# Patient Record
Sex: FEMALE | Race: WHITE
[De-identification: names, ages, dates, MRNs, and addresses within clinical notes are randomized per-mention and may not be internally consistent; named-entity substitution may affect disease eponyms.]

---

## 2017-02-11 NOTE — UC
Abdominal Pain Female HPI





- HPI Summary


HPI Summary: 


7 days of flu like sx--n/v/d for 3 -4 days decrease urination----family with 

similr symptoms that have resolved in less time








- History of Current Complaint


Chief Complaint: UCGeneralIllness


Stated Complaint: FLU SYMPTOMS


Time Seen by Provider: 02/11/17 14:49


Hx Obtained From: Patient


Hx Last Menstrual Period: 1/24/17


Pregnant?: No


Onset/Duration: Sudden Onset, Lasting Days - 7, Still Present


Timing: Constant


Severity Initially: Mild


Severity Currently: Moderate


Pain Intensity: 7


Pain Scale Used: 0-10 Numeric


Location: Diffuse


Radiates: No


Character: Cramping


Aggravating Factor(s): Food


Alleviating Factor(s): NPO


Associated Signs and Symptoms: Positive: Cough, Nausea, Vomiting, Diarrhea


Allergies/Adverse Reactions: 


 Allergies











Allergy/AdvReac Type Severity Reaction Status Date / Time


 


Cefaclor [From Ceclor] Allergy Unknown Rash Verified 07/26/14 12:16














PMH/Surg Hx/FS Hx/Imm Hx


Previously Healthy: Yes


Endocrine History Of: 


   Denies: Diabetes, Thyroid Disease


Cardiovascular History Of: 


   Denies: Cardiac Disorders, Hypertension


Respiratory History Of: 


   Denies: COPD, Asthma


GI/ History Of: 


   Denies: Ulcer





- Surgical History


Surgical History: None


Surgery Procedure, Year, and Place: 6/2017-mass removed from abdomen





- Family History


Known Family History: Positive: None





- Social History


Occupation: Employed Full-time - [employer]


Lives: With Family


Alcohol Use: Occasionally


Substance Use Type: None


Smoking Status (MU): Never Smoked Tobacco


Type: Cigarettes


Amount Used/How Often: 1/2 ppd


Have You Smoked in the Last Year: Yes


Cessation Counseling: Counseled 3+Min - 10 Min





Review of Systems


Constitutional: Fatigue


Skin: Negative


Eyes: Negative


ENT: Negative


Respiratory: Cough


Cardiovascular: Negative


Gastrointestinal: Abdominal Pain, Vomiting, Diarrhea


Genitourinary: Negative


Motor: Negative


Neurovascular: Negative


Musculoskeletal: Negative


Neurological: Negative


Psychological: Negative


All Other Systems Reviewed And Are Negative: Yes





Physical Exam


Triage Information Reviewed: Yes


Appearance: Well-Nourished, Ill-Appearing - mild, Pain Distress - mild


Vital Signs: 


 Initial Vital Signs











Temp  98.0 F   02/11/17 14:22


 


Pulse  85   02/11/17 14:22


 


Resp  16   02/11/17 14:22


 


BP  128/75   02/11/17 14:22


 


Pulse Ox  100   02/11/17 14:22











Vital Signs Reviewed: Yes


Eye Exam: Normal


Eyes: Positive: Conjunctiva Clear


ENT Exam: Normal


ENT: Positive: Normal ENT inspection, Hearing grossly normal, TMs normal.  

Negative: Nasal congestion, Nasal drainage, Tonsillar swelling, Tonsillar 

exudate, Trismus, Muffled/hoarse voice


Dental Exam: Normal


Neck exam: Normal


Neck: Positive: Supple, Nontender


Respiratory Exam: Normal


Respiratory: Positive: Chest non-tender, Lungs clear, Normal breath sounds, No 

respiratory distress, No accessory muscle use


Cardiovascular Exam: Normal


Cardiovascular: Positive: RRR, No Murmur, Pulses Normal, Brisk Capillary Refill


Abdominal Exam: Normal


Abdomen Description: Positive: No Organomegaly, Soft, Other: - diffuse 

discomfort


Bowel Sounds: Positive: Present


Musculoskeletal Exam: Normal


Musculoskeletal: Positive: Strength Intact, ROM Intact, No Edema


Neurological Exam: Normal


Neurological: Positive: Alert, Muscle Tone Normal


Psychological Exam: Normal


Psychological: Positive: Normal Response To Family


Skin Exam: Normal





Re-Evaluation





- Re-Evaluation


  ** First Eval


Change: Improved - feeling much better, taking po fluids well, voided





Abd Pain Female Course/Dx





- Course


Course Of Treatment: follow with pcp in the next 2-3 days, recheck hematuria, 

zofran clear liquids and advance diet slowly-





- Differential Dx/Diagnosis


Differential Diagnosis: Diverticulitis, Urinary Tract Infection, Other - 

hematuria, dehydration, viral illness


Provider Diagnoses: hematuria, dehydration, viral illness, acute vomiting and 

diarrhea





Discharge





- Discharge Plan


Condition: Stable


Disposition: HOME


Prescriptions: 


Ondansetron [Zofran Odt] 4 mg PO Q6H PRN #10 tab MDD 4


 PRN Reason: Nausea/Vomiting


guaiFENesin/CODIEN 100MG-10MG* [Robitussin AC 100Mg-10Mg*] 5 - 10 ml PO Q4H PRN 

#120 udc MDD 40


 PRN Reason: cough


Patient Education Materials:  Acute Nausea and Vomiting (ED), Acute Diarrhea (ED

), Hematuria (ED), Viral Syndrome (ED), Nutrition Tips for Relief of Diarrhea (

ED)


Forms:  *Work Release


Referrals: 


[facility] [place] PHYSICIAN REFERRAL [Outside] - 2 Days


Rochelle Cross MD [Primary Care Provider] -

## 2018-09-04 ENCOUNTER — HOSPITAL ENCOUNTER (EMERGENCY)
Dept: HOSPITAL 25 - UCEAST | Age: 33
Discharge: HOME | End: 2018-09-04
Payer: COMMERCIAL

## 2018-09-04 VITALS — SYSTOLIC BLOOD PRESSURE: 129 MMHG | DIASTOLIC BLOOD PRESSURE: 67 MMHG

## 2018-09-04 DIAGNOSIS — J20.9: Primary | ICD-10-CM

## 2018-09-04 DIAGNOSIS — Z88.1: ICD-10-CM

## 2018-09-04 PROCEDURE — G0463 HOSPITAL OUTPT CLINIC VISIT: HCPCS

## 2018-09-04 PROCEDURE — 99212 OFFICE O/P EST SF 10 MIN: CPT

## 2018-09-04 PROCEDURE — 71046 X-RAY EXAM CHEST 2 VIEWS: CPT

## 2018-09-04 NOTE — RAD
INDICATION: Cough, RIGHT greater than LEFT chest wheezing. Chest pain. Sensation of fever.



COMPARISON: August 15, 2011



TECHNIQUE:  Dual energy PA and routine lateral views of the chest were obtained.



REPORT: Elevated lung volumes. No focal pulmonary lesion, compelling alveolar

consolidation, pleural effusion, pneumothorax. The heart, pulmonary vasculature, and

mediastinal contours are unremarkable. Unremarkable soft tissue contours and osseous

structures. 



IMPRESSION: 

#. Elevated lung volumes suggesting potential obstructive lung disease. 

#. No evidence for pneumonia.

## 2018-09-04 NOTE — UC
Respiratory Complaint HPI





- HPI Summary


HPI Summary: 





Pleasant 32 yo female with progressive cough, chest congestion since Saturday (

today is Tues).  + subjective fever, not feeling well.  No rash.  + sound 

congested, but does not feel sinuses congested perse.  No rash.  Some nausea (

not sure if d/t cough), some recent loose stool.  Sx started as sore throat, 

which is better now.  Reports that she has received influenza vaccine.  Does 

not typically wheeze, albeit rarely with illness, also as a child but grew out 

of it.





- History of Current Complaint


Chief Complaint: UCRespiratory


Stated Complaint: CHEST CONGESTION


Time Seen by Provider: 09/04/18 09:39


Hx Obtained From: Patient


Hx Last Menstrual Period: 1/24/17


Pain Intensity: 5





- Allergies/Home Medications


Allergies/Adverse Reactions: 


 Allergies











Allergy/AdvReac Type Severity Reaction Status Date / Time


 


cefaclor [From [facility]] Allergy  Rash Verified 09/04/18 09:08











Home Medications: 


 Home Medications





Guaifenesin/Dextromethorphan [Robitussin Cough & Chest  mg/20Ml] 1 liq PO 

09/04/18 [History]











PMH/Surg Hx/FS Hx/Imm Hx


Previously Healthy: Yes





- Surgical History


Surgical History: Yes


Surgery Procedure, Year, and Place: 6/2017-mass removed from abdomen





- Family History


Known Family History: Positive: None





- Social History


Occupation: Employed Full-time


Alcohol Use: Rare


Substance Use Type: None


Smoking Status (MU): Never Smoked Tobacco


Type: Cigarettes


Amount Used/How Often: 1/2 ppd


Have You Smoked in the Last Year: Yes





Review of Systems


Constitutional: Other - see hpi


Skin: Negative


Eyes: Other - see hpi


ENT: Other - see hpi


Respiratory: Cough, Other - see hpi


Cardiovascular: Negative


Gastrointestinal: Negative


Genitourinary: Negative


Motor: Negative


Neurovascular: Negative


Musculoskeletal: Negative


Neurological: Negative


Psychological: Negative


Is Patient Immunocompromised?: No


All Other Systems Reviewed And Are Negative: Yes





Physical Exam


Triage Information Reviewed: Yes


Appearance: Well-Nourished - sitting up, looks tired, but NAD


Vital Signs: 


 Initial Vital Signs











Temp  98.6 F   09/04/18 09:04


 


Pulse  82   09/04/18 09:04


 


Resp  18   09/04/18 09:04


 


BP  119/78   09/04/18 09:04


 


Pulse Ox  97   09/04/18 09:04











Vital Signs Reviewed: Yes


Eye Exam: Normal


ENT: Positive: Other - Post pharynx, mild edema, no sores / exudates grossly 

appreciated.  Uvula midline, airway patent.  No stridor.


Neck exam: Normal


Neck: Positive: Supple, Nontender


Respiratory Exam: Other


Respiratory: Positive: Wheezing, Other: - + insp / exp wheezing all lobes, R>L 

in intensity


Cardiovascular Exam: Normal


Cardiovascular: Positive: RRR, Pulses Normal, Brisk Capillary Refill


Abdominal Exam: Normal


Abdomen Description: Positive: Nontender


Musculoskeletal Exam: Normal - gait steady, moves x 4 ext


Neurological Exam: Normal - grossly nonfocal


Psychological Exam: Normal - conversing easily and appropriately


Skin Exam: Normal - no visible or reported rash





UC Diagnostic Evaluation





- Laboratory


O2 Sat by Pulse Oximetry: 97





Respiratory Course/Dx





- Course


Course Of Treatment: Considered influenza, but > 72 hrs since onset sx.  Did 

have infl vaccine.  Offered duoneb, she carefully considered but declined.  CXR 

ordered.  Reviewed cxr results with pt.  Reviewed need for follow up with pcp 

re respiratory recheck.  Questions as posed answered to the best of my ability.





- Differential Dx/Diagnosis


Differential Diagnosis/HQI/PQRI: Airway Obstruction


Provider Diagnoses: Bronchitis with wheezing.  Possible obstructive resp dz





Discharge





- Sign-Out/Discharge


Documenting (check all that apply): Patient Departure


All imaging exams completed and their final reports reviewed: Yes





- Discharge Plan


Condition: Stable


Disposition: HOME


Prescriptions: 


Albuterol HFA INHALER* [Ventolin HFA Inhaler*] 1 - 2 puff INH Q4H PRN #1 mdi


 PRN Reason: Wheezing


Azithromyxin DENNY (NF) [Z-Denny (Zithromax) 250 mg tabs #6] 2 tab PO .TODAY, THEN 

1 DAILY #6 tab


predniSONE TAB* [Deltasone 10 MG TAB*] 10 mg PO DAILY #20 tab


Patient Education Materials:  Acute Bronchitis (ED), Bronchospasm (ED)


Forms:  *Work Release


Referrals: 


No Primary Care Phys,NOPCP [Primary Care Provider] - 


Additional Instructions: 


Please follow up with your primary care physician in the next 1-2 weeks for 

recheck.  


Seek medical attention for worse or new problems in the meantime.  











- Billing Disposition and Condition


Condition: STABLE


Disposition: Home